# Patient Record
Sex: FEMALE | Race: BLACK OR AFRICAN AMERICAN
[De-identification: names, ages, dates, MRNs, and addresses within clinical notes are randomized per-mention and may not be internally consistent; named-entity substitution may affect disease eponyms.]

---

## 2022-10-26 ENCOUNTER — OFFICE VISIT (OUTPATIENT)
Dept: PRIMARY CARE CLINIC | Facility: CLINIC | Age: 47
End: 2022-10-26
Payer: MEDICARE

## 2022-10-26 VITALS
OXYGEN SATURATION: 95 % | HEIGHT: 72 IN | HEART RATE: 93 BPM | BODY MASS INDEX: 37.25 KG/M2 | RESPIRATION RATE: 17 BRPM | WEIGHT: 275 LBS | SYSTOLIC BLOOD PRESSURE: 162 MMHG | TEMPERATURE: 97.9 F | DIASTOLIC BLOOD PRESSURE: 107 MMHG

## 2022-10-26 DIAGNOSIS — Z12.31 ENCOUNTER FOR SCREENING MAMMOGRAM FOR MALIGNANT NEOPLASM OF BREAST: ICD-10-CM

## 2022-10-26 DIAGNOSIS — R73.03 PRE-DIABETES: ICD-10-CM

## 2022-10-26 DIAGNOSIS — L40.9 PSORIASIS: ICD-10-CM

## 2022-10-26 DIAGNOSIS — M25.561 CHRONIC PAIN OF RIGHT KNEE: Primary | ICD-10-CM

## 2022-10-26 DIAGNOSIS — Z11.59 ENCOUNTER FOR HEPATITIS C SCREENING TEST FOR LOW RISK PATIENT: ICD-10-CM

## 2022-10-26 DIAGNOSIS — E78.2 MIXED HYPERLIPIDEMIA: ICD-10-CM

## 2022-10-26 DIAGNOSIS — I10 ESSENTIAL HYPERTENSION: ICD-10-CM

## 2022-10-26 DIAGNOSIS — Z11.4 ENCOUNTER FOR SCREENING FOR HIV: ICD-10-CM

## 2022-10-26 DIAGNOSIS — Z01.419 PERIODIC HEALTH ASSESSMENT, PAP AND PELVIC: ICD-10-CM

## 2022-10-26 DIAGNOSIS — Z79.899 MEDICATION MANAGEMENT: ICD-10-CM

## 2022-10-26 DIAGNOSIS — R87.618 OTHER ABNORMAL CYTOLOGICAL FINDING OF SPECIMEN FROM CERVIX: ICD-10-CM

## 2022-10-26 DIAGNOSIS — E66.01 SEVERE OBESITY (BMI 35.0-39.9) WITH COMORBIDITY (HCC): ICD-10-CM

## 2022-10-26 DIAGNOSIS — G89.29 CHRONIC PAIN OF RIGHT KNEE: Primary | ICD-10-CM

## 2022-10-26 DIAGNOSIS — F17.210 CIGARETTE SMOKER: ICD-10-CM

## 2022-10-26 PROBLEM — M19.071 PRIMARY OSTEOARTHRITIS OF RIGHT ANKLE: Status: ACTIVE | Noted: 2022-07-21

## 2022-10-26 PROCEDURE — 99204 OFFICE O/P NEW MOD 45 MIN: CPT | Performed by: FAMILY MEDICINE

## 2022-10-26 PROCEDURE — 3077F SYST BP >= 140 MM HG: CPT | Performed by: FAMILY MEDICINE

## 2022-10-26 PROCEDURE — 3080F DIAST BP >= 90 MM HG: CPT | Performed by: FAMILY MEDICINE

## 2022-10-26 PROCEDURE — 99406 BEHAV CHNG SMOKING 3-10 MIN: CPT | Performed by: FAMILY MEDICINE

## 2022-10-26 RX ORDER — DICLOFENAC SODIUM 75 MG/1
TABLET, DELAYED RELEASE ORAL
COMMUNITY
Start: 2022-10-11

## 2022-10-26 RX ORDER — MELOXICAM 7.5 MG/1
TABLET ORAL
COMMUNITY
Start: 2022-08-15

## 2022-10-26 ASSESSMENT — ENCOUNTER SYMPTOMS
BACK PAIN: 0
TROUBLE SWALLOWING: 0
DIARRHEA: 0
RHINORRHEA: 0
ABDOMINAL PAIN: 0
COLOR CHANGE: 0
VOICE CHANGE: 0
VOMITING: 0
CONSTIPATION: 0
SINUS PRESSURE: 0
WHEEZING: 0
CHOKING: 0
SINUS PAIN: 0
BLOOD IN STOOL: 0
PHOTOPHOBIA: 0
EYE PAIN: 0
COUGH: 0
EYE DISCHARGE: 0
SORE THROAT: 0
CHEST TIGHTNESS: 0
NAUSEA: 0
ABDOMINAL DISTENTION: 0
EYE REDNESS: 0
SHORTNESS OF BREATH: 0

## 2022-10-26 ASSESSMENT — PATIENT HEALTH QUESTIONNAIRE - PHQ9
SUM OF ALL RESPONSES TO PHQ QUESTIONS 1-9: 0
SUM OF ALL RESPONSES TO PHQ QUESTIONS 1-9: 0
1. LITTLE INTEREST OR PLEASURE IN DOING THINGS: 0
SUM OF ALL RESPONSES TO PHQ9 QUESTIONS 1 & 2: 0
SUM OF ALL RESPONSES TO PHQ QUESTIONS 1-9: 0
SUM OF ALL RESPONSES TO PHQ QUESTIONS 1-9: 0
2. FEELING DOWN, DEPRESSED OR HOPELESS: 0
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
## 2022-10-26 NOTE — PATIENT INSTRUCTIONS
Diabetic teaching, diet, increase vegetables- at least 5 portions a day, roughly half plate, beans, whole grains, grilled or baked white meats , dairy products, exercise at least an hr - brisk walk aerobic of choice, No sugar/ suggary drinks including juice/ fats/ fried foods.  starch- bread/ potato/ rice. ( It takes roughly 40 minute of walk  To burn a 12 oz regular drink/ juice). Cutting out 12 oz drink a day equals to roughly 4 lb weight a yr! Decrease screen time- TV, Video, computer , cell phones- recommended less than 2 hrs a day  Type 2 diabetes is very common, obesity is the main reason for diabetes and  insulin resistance, most of the type 2 diabetes can be cured by weight management exercise. . Most type 2 diabetes has high insulin level  and high insulin level causes most of diabetic complications microvascular and macrovascular, damage to kidneys, eyes , cardiovascular , and neuropathy,, medications that correct insulin resistance such as metformin has been shown to decrease these complications by lowering insulin level and correcting insulin resistance. Frequent blood sugar checking is unnecessary    Frequent blood sugar checking is not necessity, normal person without diabetess fasting blood sugar is usually less than 105, after 3 -4 weeks of treatment, either diet alone, or diet and metformin, if fasting blood sugar less than 120, frequent BS checking is not necessary and continue diet exercise Metformin is enough. Starting metformin early and preventing diabetic complications. Exercise and weight management is most important    Adding insulin and continuing increasing dose,  not usually prevent diabetic complications .  Some newer medications that do not cause low BS, may help diabeted by supressing apetite and making pee sugar , may help loose weight ,  may be more beneficial when over weight, but are quite expensive and often not covered by insurance, long term benefits are not known, and do have lot of side effects and risks    High blood sugar less than 300 usually causes no symptoms and patient is unaware, of the diabetes, and causes a significant diabetic complications and #1 cause of losing legs , kidneys and eye sight and cardiovascular risk     Focusing on blood sugar does not prevent diabetic complication, but diet, exercise , weight management ,  metformin early on , do prevent diabetic complications    If insulin do become necessary, usually 30-40 unit long acting insulin taken bed time, with small frequent meals may be more beneficial, keeping fasting blood sugar less than 140, through diet , exercise, weight management and metformin- recommended as first line diabetic medication with GFR more than 30 by all most medical organizations, and need be continued with or without insulin. In normal weight persons BMI less than 25, may be insulin deficient and Insulin log acting usually less than 30 units may help , with or without metformin if fasting BS more than 140  . Increase fruits vegetables, fiber, whole grains, beans, exercise, tree nuts, will decrease risk of heart attacks and strokes, may reduce cancer risks     once a day multivitamin such as Centrum  store brand, due to benefit of folic acid vitamin D, has already mineral vitamin is recommended doses. Multiple different vitamins not recommended may carry increased risk, including vitamin E, take foods rich in omega 3 and fibre, pills are not recommended, including omega 3 in high doses, may have increased risks     Chronic smokers, smokes. .... , conseled risk of smoking, chronic bronchitis, DVT, peripheral vascular diease, cancers, cardio-vascular disease, osteoporosis, COPD/ emphysema, PUD. Advised to quit smoking. ... , discussed nicotine replacement therapy, recommended short term OTC if helps to quit smoking.  3-5 minutes spent on counseling

## 2022-10-27 LAB
ALBUMIN SERPL-MCNC: 3.6 G/DL (ref 3.5–5)
ALBUMIN/GLOB SERPL: 1.1 {RATIO} (ref 0.4–1.6)
ALP SERPL-CCNC: 89 U/L (ref 50–136)
ALT SERPL-CCNC: 17 U/L (ref 12–65)
ANION GAP SERPL CALC-SCNC: 3 MMOL/L (ref 2–11)
AST SERPL-CCNC: 9 U/L (ref 15–37)
BASOPHILS # BLD: 0 K/UL (ref 0–0.2)
BASOPHILS NFR BLD: 1 % (ref 0–2)
BILIRUB SERPL-MCNC: 0.3 MG/DL (ref 0.2–1.1)
BUN SERPL-MCNC: 10 MG/DL (ref 6–23)
CALCIUM SERPL-MCNC: 9.5 MG/DL (ref 8.3–10.4)
CHLORIDE SERPL-SCNC: 109 MMOL/L (ref 101–110)
CHOLEST SERPL-MCNC: 190 MG/DL
CO2 SERPL-SCNC: 26 MMOL/L (ref 21–32)
CREAT SERPL-MCNC: 1 MG/DL (ref 0.6–1)
DIFFERENTIAL METHOD BLD: NORMAL
EOSINOPHIL # BLD: 0.2 K/UL (ref 0–0.8)
EOSINOPHIL NFR BLD: 3 % (ref 0.5–7.8)
ERYTHROCYTE [DISTWIDTH] IN BLOOD BY AUTOMATED COUNT: 14.6 % (ref 11.9–14.6)
EST. AVERAGE GLUCOSE BLD GHB EST-MCNC: 123 MG/DL
GLOBULIN SER CALC-MCNC: 3.2 G/DL (ref 2.8–4.5)
GLUCOSE SERPL-MCNC: 98 MG/DL (ref 65–100)
HBA1C MFR BLD: 5.9 % (ref 4.8–5.6)
HCT VFR BLD AUTO: 44.4 % (ref 35.8–46.3)
HCV AB SER QL: NONREACTIVE
HDLC SERPL-MCNC: 40 MG/DL (ref 40–60)
HDLC SERPL: 4.8 {RATIO}
HGB BLD-MCNC: 14 G/DL (ref 11.7–15.4)
HIV 1+2 AB+HIV1 P24 AG SERPL QL IA: NONREACTIVE
HIV 1/2 RESULT COMMENT: NORMAL
IMM GRANULOCYTES # BLD AUTO: 0 K/UL (ref 0–0.5)
IMM GRANULOCYTES NFR BLD AUTO: 0 % (ref 0–5)
LDLC SERPL CALC-MCNC: 125.8 MG/DL
LYMPHOCYTES # BLD: 2.6 K/UL (ref 0.5–4.6)
LYMPHOCYTES NFR BLD: 35 % (ref 13–44)
MCH RBC QN AUTO: 27.5 PG (ref 26.1–32.9)
MCHC RBC AUTO-ENTMCNC: 31.5 G/DL (ref 31.4–35)
MCV RBC AUTO: 87.1 FL (ref 82–102)
MONOCYTES # BLD: 0.7 K/UL (ref 0.1–1.3)
MONOCYTES NFR BLD: 9 % (ref 4–12)
NEUTS SEG # BLD: 4 K/UL (ref 1.7–8.2)
NEUTS SEG NFR BLD: 52 % (ref 43–78)
NRBC # BLD: 0 K/UL (ref 0–0.2)
PLATELET # BLD AUTO: 395 K/UL (ref 150–450)
PMV BLD AUTO: 9.8 FL (ref 9.4–12.3)
POTASSIUM SERPL-SCNC: 4.2 MMOL/L (ref 3.5–5.1)
PROT SERPL-MCNC: 6.8 G/DL (ref 6.3–8.2)
RBC # BLD AUTO: 5.1 M/UL (ref 4.05–5.2)
SODIUM SERPL-SCNC: 138 MMOL/L (ref 133–143)
TRIGL SERPL-MCNC: 121 MG/DL (ref 35–150)
TSH, 3RD GENERATION: 1.38 UIU/ML (ref 0.36–3.74)
VLDLC SERPL CALC-MCNC: 24.2 MG/DL (ref 6–23)
WBC # BLD AUTO: 7.5 K/UL (ref 4.3–11.1)

## 2022-11-02 ENCOUNTER — TELEPHONE (OUTPATIENT)
Dept: PRIMARY CARE CLINIC | Facility: CLINIC | Age: 47
End: 2022-11-02

## 2022-11-02 NOTE — TELEPHONE ENCOUNTER
----- Message from Cherrie Rogers MD sent at 10/27/2022  8:48 AM EDT -----  .   Hyperlipidemia prediabetic, low-cholesterol diet weight management healthy diet decrease sugar starch fat fried foods

## 2022-11-15 ENCOUNTER — OFFICE VISIT (OUTPATIENT)
Dept: OBGYN CLINIC | Age: 47
End: 2022-11-15
Payer: MEDICARE

## 2022-11-15 VITALS
BODY MASS INDEX: 36.7 KG/M2 | HEIGHT: 72 IN | DIASTOLIC BLOOD PRESSURE: 90 MMHG | SYSTOLIC BLOOD PRESSURE: 142 MMHG | WEIGHT: 271 LBS

## 2022-11-15 DIAGNOSIS — N92.6 IRREGULAR MENSES: ICD-10-CM

## 2022-11-15 DIAGNOSIS — Z12.4 PAP SMEAR FOR CERVICAL CANCER SCREENING: Primary | ICD-10-CM

## 2022-11-15 DIAGNOSIS — Z11.3 SCREEN FOR STD (SEXUALLY TRANSMITTED DISEASE): ICD-10-CM

## 2022-11-15 DIAGNOSIS — I10 ESSENTIAL HYPERTENSION: ICD-10-CM

## 2022-11-15 DIAGNOSIS — Z01.419 ENCOUNTER FOR ANNUAL ROUTINE GYNECOLOGICAL EXAMINATION: ICD-10-CM

## 2022-11-15 PROCEDURE — 3080F DIAST BP >= 90 MM HG: CPT | Performed by: NURSE PRACTITIONER

## 2022-11-15 PROCEDURE — 99386 PREV VISIT NEW AGE 40-64: CPT | Performed by: NURSE PRACTITIONER

## 2022-11-15 PROCEDURE — 3077F SYST BP >= 140 MM HG: CPT | Performed by: NURSE PRACTITIONER

## 2022-11-15 RX ORDER — FLUOCINOLONE ACETONIDE 0.11 MG/ML
OIL TOPICAL 3 TIMES DAILY
COMMUNITY

## 2022-11-15 RX ORDER — KETOCONAZOLE 20 MG/ML
SHAMPOO TOPICAL
COMMUNITY
Start: 2022-10-19

## 2022-11-15 ASSESSMENT — PATIENT HEALTH QUESTIONNAIRE - PHQ9
2. FEELING DOWN, DEPRESSED OR HOPELESS: 0
SUM OF ALL RESPONSES TO PHQ9 QUESTIONS 1 & 2: 0
SUM OF ALL RESPONSES TO PHQ QUESTIONS 1-9: 0
1. LITTLE INTEREST OR PLEASURE IN DOING THINGS: 0
SUM OF ALL RESPONSES TO PHQ QUESTIONS 1-9: 0

## 2022-11-15 NOTE — PROGRESS NOTES
Sarkis Wang is a 56 y.o. [id number] who is here today for annual exam. Pt reports hot flashes since she was in her 35s. States they don't bother her and she doesn't want any treatment for them at this time. Pt seen by PCP with elevated blood pressures last month. No medications at this time and has f/u with PCP in 2 weeks        Patient's last menstrual period was 2022 (approximate). Menses: pt does not keep menstrual calender difficult t obtain hx. Usually at least monthly, although in the last year did have 1 or 2 menses that were a week apart. This is not her normal. Last menses ws light flow, lasted only 2 days    Birth Control:essure    Last Pap:2018    Hx abnormal pap or STD:trich vag    Last Mammo: referral sent by PCP    Fam Hx of Breast, ovarian or uterine cancer:denies    Sexually Active:yes    Number of partners in the last year:1          ROS:    Breast: Denies pain, lump or nipple discharge    GYN: Denies pelvic pain, discharge, itching, odor or dysuria. Constitutional: Negative for chills and fever. HENT: Negative for severe headaches or vision changes    Respiratory: Negative for cough and shortness of breath. Cardiovascular: Negative for chest pain and palpitations. Gastrointestinal: Negative for nausea and vomiting. Negative for diarrhea and constipation    Genitourinary: Negative for dysuria and hematuria. Musculoskeletal: Negative for back pain    Skin: Negative for rash and wound.      Psych: No depression or anxiety          Past Medical History:   Diagnosis Date    Abnormal Pap smear of cervix        Past Surgical History:   Procedure Laterality Date    BREAST ENHANCEMENT SURGERY Bilateral     CERCLAGE       SECTION      WRIST SURGERY Bilateral        Family History   Problem Relation Age of Onset    Breast Cancer Neg Hx     Ovarian Cancer Neg Hx     Uterine Cancer Neg Hx     Colon Cancer Neg Hx        Social History     Socioeconomic History    Marital status: Single     Spouse name: Not on file    Number of children: Not on file    Years of education: Not on file    Highest education level: Not on file   Occupational History    Not on file   Tobacco Use    Smoking status: Every Day     Types: Cigarettes    Smokeless tobacco: Never   Substance and Sexual Activity    Alcohol use: Never    Drug use: Never    Sexual activity: Not Currently     Birth control/protection: Surgical     Comment: essure   Other Topics Concern    Not on file   Social History Narrative    Not on file     Social Determinants of Health     Financial Resource Strain: Not on file   Food Insecurity: Not on file   Transportation Needs: Not on file   Physical Activity: Not on file   Stress: Not on file   Social Connections: Not on file   Intimate Partner Violence: Not on file   Housing Stability: Not on file           Objective:    Vitals:    11/15/22 1403   BP: (!) 142/90   Site: Left Upper Arm   Position: Sitting   Weight: 271 lb (122.9 kg)   Height: 6' (1.829 m)           Constitutional:  well-developed, well-nourished, and in no distress. Mental: Alert and awake. Oriented to person/place/time. Able to follow commands    Eyes: EOM nl, Sclera nl, Ocular Discharge not visualized    HENT: Normocephalic and atraumatic. Mouth/Throat: Mucous membranes are moist. External Ears: nl. No cervical code adneopathy    Neck: Normal range of motion. Neck supple. No thyromegaly present. Cardiovascular: Normal rate and regular rhythm. Pulmonary: Effort normal; No visualized signs of difficulty breathing or respiratory distress; breath sounds normal.    Breast: The breasts exhibit no masses, no skin changes and no tenderness. No axillary node adenopathy. piercings    Abdominal: Soft. There is no tenderness. There is no rebound.      Pelvic Exam:       External: normal female genitalia without lesions or masses       Vagina: normal without lesions or masses, no discharge noted      Cervix: normal without lesions or masses       Adnexa: normal bimanual exam without masses or fullness       Uterus: uterus is normal size, shape, consistency and nontender    Musculoskeletal: Normal range of motion. Normal gait with no signs of ataxia     Neurological: No Facial Asymmetry (Cranial nerve 7 motor function); No gaze palsy; normal coordination, muscle strength and tone     Skin: Skin is warm and dry. No significant exanthematous lesions or discoloration noted on facial skin      Psych: Normal affect. No hallucinations            Assessment/Plan            Patient Active Problem List    Diagnosis Date Noted    Encounter for annual routine gynecological examination 11/15/2022     Priority: Medium     Assessment & Plan Note:     Pap today + GC (recent std screening negative)  mammo referral sent by PCP, pt given number to call to schedule  essure for birth control      Irregular menses 11/15/2022     Priority: Medium     Assessment & Plan Note:     Difficult to btain menstrual hx but suspect perimenopausal bleeding but will get US and discuss menstrual calender      Severe obesity (BMI 35.0-39. 9) with comorbidity ([address] 10/26/2022     Priority: Medium    Essential hypertension 10/26/2022     Priority: Medium     Assessment & Plan Note:     Followed by PCP      Cigarette smoker 07/21/2022     Priority: Medium     Overview Note:     Last Assessment & Plan:   Formatting of this note is different from the original.  Condition: Everyday Smoker  Member encouraged to continue working on smoking cessation. Encouraged to discuss options with PCP. Provided number 3-433-QUIT-NOW ([phone]) for free confidential help to assist her with quitting smoking. [employer] provide free coaching--over the phone--to help you quit smoking. When you call 4-540-QUIT-NOW , you can speak confidentially with a highly trained quit .     Follow up in: three months      Primary osteoarthritis of right ankle 07/21/2022     Priority: Medium Overview Note:     Last Assessment & Plan:   Formatting of this note might be different from the original.  Condition: stable  Cont therapy 2x a week   Take medications as Rx  Follow up in: three months      Psoriasis 07/21/2022     Priority: Medium     Overview Note:     Last Assessment & Plan:   Formatting of this note might be different from the original.  Condition: stable  Ensure medication compliance as prescribed by your PCP/Specialist.  Keep all schedule appointments. Follow up in: three months         Problem List Items Addressed This Visit          Circulatory    Essential hypertension     Followed by PCP            Other    Encounter for annual routine gynecological examination     Pap today + GC (recent std screening negative)  mammo referral sent by PCP, pt given number to call to schedule  essure for birth control         Irregular menses     Difficult to btain menstrual hx but suspect perimenopausal bleeding but will get US and discuss menstrual calender          Other Visit Diagnoses       Pap smear for cervical cancer screening    -  Primary    Relevant Orders    PAP IG, CT-NG-TV, Aptima HPV and rfx 16/18,45 ([id number])    Screen for STD (sexually transmitted disease)        Relevant Orders    PAP IG, CT-NG-TV, Aptima HPV and rfx 16/18,45 ([id number])            Orders Placed This Encounter   Procedures    PAP IG, CT-NG-TV, Aptima HPV and rfx 16/18,45 ([id number])       Outpatient Encounter Medications as of 11/15/2022   Medication Sig Dispense Refill    ketoconazole (NIZORAL) 2 % shampoo WASH SCALP 2-3 TIMES A WEEK, LET IT SIT FOR 10-15 MINUTES THEN RINSE      fluocinolone (DERMA-SMOOTHE) 0.01 % external oil Apply topically 3 times daily Apply topically.       diclofenac (VOLTAREN) 75 MG EC tablet       meloxicam (MOBIC) 7.5 MG tablet TAKE 1 TABLET BY MOUTH TWICE DAILY AS NEEDED FOR MODERATE PAIN (Patient not taking: No sig reported)       No facility-administered encounter medications on file as of 11/15/2022.

## 2022-11-15 NOTE — ASSESSMENT & PLAN NOTE
Difficult to btain menstrual hx but suspect perimenopausal bleeding but will get US and discuss menstrual calender

## 2022-11-15 NOTE — PROGRESS NOTES
Pt comes in today for AE. Pt reports regular periods every month but not at the same time of the months. Pt reports last period was very light and short. Pt has also stated hot flashes since age 40. LAST PAP:  09/13/2018 ASCUS, HPV positive     LAST MAMMO: Unknown     LMP:  Patient's last menstrual period was 11/02/2022 (approximate).     BIRTH CONTROL:  none    TOBACCO USE:  Yes    FAMILY HISTORY OF:   Breast Cancer:  No   Ovarian Cancer:  No   Uterine Cancer:  No   Colon Cancer:  No    Vitals:    11/15/22 1403   BP: (!) 142/90   Site: Left Upper Arm   Position: Sitting   Weight: 271 lb (122.9 kg)   Height: 6' (1.829 m)        Pramod Rogers MA  11/15/22  2:20 PM

## 2022-11-15 NOTE — PROGRESS NOTES
I have reviewed the patient's visit today including history, exam and assessment by VIRI White. I agree with treatment/plan as above.     Juan Diego Hill MD  4:06 PM  11/15/22

## 2022-11-15 NOTE — ASSESSMENT & PLAN NOTE
Pap today + GC (recent std screening negative)  mammo referral sent by PCP, pt given number to call to schedule  essure for birth control

## 2022-11-22 LAB
C TRACH RRNA CVX QL NAA+PROBE: NEGATIVE
CYTOLOGIST CVX/VAG CYTO: ABNORMAL
CYTOLOGY CVX/VAG DOC THIN PREP: ABNORMAL
HPV APTIMA: POSITIVE
HPV GENOTYPE 18,45: NEGATIVE
HPV GENOTYPE REFLEX: ABNORMAL
HPV, GENOTYPE 16: POSITIVE
Lab: ABNORMAL
N GONORRHOEA RRNA CVX QL NAA+PROBE: NEGATIVE
PATH REPORT.FINAL DX SPEC: ABNORMAL
PATHOLOGIST CVX/VAG CYTO: ABNORMAL
STAT OF ADQ CVX/VAG CYTO-IMP: ABNORMAL
T VAGINALIS RRNA SPEC QL NAA+PROBE: NEGATIVE

## 2022-11-23 ENCOUNTER — TELEPHONE (OUTPATIENT)
Dept: OBGYN CLINIC | Age: 47
End: 2022-11-23

## 2022-11-23 NOTE — TELEPHONE ENCOUNTER
Please let pt know pap smear was negative for precancer or cancer cells, but she did test positive for HPV type 16 which can lead to precancer changes. Please schedule patient for colposcopy (after her [address] visit on 11/28/2022), send [employer] handout on abnormal pap smear) and review the following        We encourage you to stop smoking (if you do)  Take a multivitamin each day  Eat more cruciferous vegetables (sweet potatoes, spinach, kale, papaya, oranges, sweet peppers,and tomatoes) and other things high in antioxidants like green tea, pomegranate, dark chocolate, etc.  This will boost your immune system and help with your abnormal pap smear. Exercise 30minutes/day  To help prevent the spread of sexually transmitted diseases, condoms must be worn during sexual activity.

## 2022-11-25 NOTE — TELEPHONE ENCOUNTER
Called pt and let her know her pap smear was negative for precancer or cancer cells, but she did test positive for HPV type 16 which can lead to precancer changes. Encouraged pt to stop smoking, eat more cruciferous vegetables (sweet potatoes, spinach, kale, papaya, oranges, sweet peppers,and tomatoes) and other things high in antioxidants like green tea, pomegranate, dark chocolate, etc to help boost her immune system. Also encouraged pt to take a multivitamin each day, exercise 30 minutes each day, and to wear a condom during sexual activity. Pt voiced understanding. Pt would like to schedule colpo at her next visit on 11/29. Please send [employer] handout.

## 2022-11-28 ENCOUNTER — OFFICE VISIT (OUTPATIENT)
Dept: PRIMARY CARE CLINIC | Facility: CLINIC | Age: 47
End: 2022-11-28
Payer: MEDICARE

## 2022-11-28 VITALS
WEIGHT: 268 LBS | RESPIRATION RATE: 16 BRPM | BODY MASS INDEX: 36.3 KG/M2 | HEIGHT: 72 IN | SYSTOLIC BLOOD PRESSURE: 152 MMHG | OXYGEN SATURATION: 98 % | DIASTOLIC BLOOD PRESSURE: 96 MMHG | HEART RATE: 102 BPM | TEMPERATURE: 97.7 F

## 2022-11-28 DIAGNOSIS — G89.29 CHRONIC PAIN OF RIGHT KNEE: ICD-10-CM

## 2022-11-28 DIAGNOSIS — E78.41 ELEVATED LIPOPROTEIN(A): ICD-10-CM

## 2022-11-28 DIAGNOSIS — R73.03 PRE-DIABETES: ICD-10-CM

## 2022-11-28 DIAGNOSIS — M25.561 CHRONIC PAIN OF RIGHT KNEE: ICD-10-CM

## 2022-11-28 DIAGNOSIS — F17.210 CIGARETTE SMOKER: ICD-10-CM

## 2022-11-28 DIAGNOSIS — Z00.01 ENCOUNTER FOR GENERAL ADULT MEDICAL EXAMINATION WITH ABNORMAL FINDINGS: Primary | ICD-10-CM

## 2022-11-28 DIAGNOSIS — R87.810 CERVICAL HIGH RISK HPV (HUMAN PAPILLOMAVIRUS) TEST POSITIVE: ICD-10-CM

## 2022-11-28 DIAGNOSIS — Z00.00 ENCOUNTER FOR WELL ADULT EXAM WITHOUT ABNORMAL FINDINGS: ICD-10-CM

## 2022-11-28 DIAGNOSIS — I10 UNCONTROLLED HYPERTENSION: ICD-10-CM

## 2022-11-28 DIAGNOSIS — E66.01 SEVERE OBESITY (BMI 35.0-39.9) WITH COMORBIDITY (HCC): ICD-10-CM

## 2022-11-28 DIAGNOSIS — Z91.89 MULTIPLE RISK FACTORS FOR CORONARY ARTERY DISEASE: ICD-10-CM

## 2022-11-28 DIAGNOSIS — M19.90 ARTHRITIS: ICD-10-CM

## 2022-11-28 DIAGNOSIS — Z79.899 MEDICATION MANAGEMENT: ICD-10-CM

## 2022-11-28 PROCEDURE — 3077F SYST BP >= 140 MM HG: CPT | Performed by: FAMILY MEDICINE

## 2022-11-28 PROCEDURE — 99396 PREV VISIT EST AGE 40-64: CPT | Performed by: FAMILY MEDICINE

## 2022-11-28 PROCEDURE — 99213 OFFICE O/P EST LOW 20 MIN: CPT | Performed by: FAMILY MEDICINE

## 2022-11-28 PROCEDURE — 3080F DIAST BP >= 90 MM HG: CPT | Performed by: FAMILY MEDICINE

## 2022-11-28 RX ORDER — PRAVASTATIN SODIUM 40 MG
40 TABLET ORAL DAILY
Qty: 90 TABLET | Refills: 5 | Status: SHIPPED | OUTPATIENT
Start: 2022-11-28

## 2022-11-28 RX ORDER — AMLODIPINE BESYLATE 5 MG/1
5 TABLET ORAL DAILY
Qty: 90 TABLET | Refills: 1 | Status: SHIPPED | OUTPATIENT
Start: 2022-11-28

## 2022-11-28 RX ORDER — CHLORTHALIDONE 25 MG/1
25 TABLET ORAL DAILY
Qty: 90 TABLET | Refills: 3 | Status: SHIPPED | OUTPATIENT
Start: 2022-11-28

## 2022-11-28 RX ORDER — MULTIVIT-MIN/IRON FUM/FOLIC AC 7.5 MG-4
TABLET ORAL
Qty: 100 TABLET | Refills: 3 | Status: SHIPPED | OUTPATIENT
Start: 2022-11-28

## 2022-11-28 ASSESSMENT — PATIENT HEALTH QUESTIONNAIRE - PHQ9
SUM OF ALL RESPONSES TO PHQ QUESTIONS 1-9: 0
SUM OF ALL RESPONSES TO PHQ QUESTIONS 1-9: 0
2. FEELING DOWN, DEPRESSED OR HOPELESS: 0
SUM OF ALL RESPONSES TO PHQ QUESTIONS 1-9: 0
SUM OF ALL RESPONSES TO PHQ9 QUESTIONS 1 & 2: 0
1. LITTLE INTEREST OR PLEASURE IN DOING THINGS: 0
SUM OF ALL RESPONSES TO PHQ QUESTIONS 1-9: 0

## 2022-11-28 ASSESSMENT — ENCOUNTER SYMPTOMS
BACK PAIN: 0
COLOR CHANGE: 0
VOICE CHANGE: 0
RHINORRHEA: 0
DIARRHEA: 0
ABDOMINAL DISTENTION: 0
CHOKING: 0
EYE DISCHARGE: 0
CONSTIPATION: 0
NAUSEA: 0
SINUS PRESSURE: 0
SORE THROAT: 0
WHEEZING: 0
VOMITING: 0
ABDOMINAL PAIN: 0
PHOTOPHOBIA: 0
BLOOD IN STOOL: 0
TROUBLE SWALLOWING: 0
EYE PAIN: 0
SHORTNESS OF BREATH: 0
EYE REDNESS: 0
SINUS PAIN: 0
COUGH: 0
CHEST TIGHTNESS: 0

## 2022-11-28 ASSESSMENT — VISUAL ACUITY
OD_CC: 20/30
OS_CC: 20/40

## 2022-11-28 NOTE — PROGRESS NOTES
[facility] Adult Note  Name: Lawrence Nava Date: 2022   MRN: [id number] Sex: Female   Age: 56 y.o. Ethnicity: Non- / Non    : 1975 Race: Dearl Niraj / African American      Timothyjessie Boo is here for well adult exam.  History:      Review of Systems   Constitutional:  Negative for activity change, appetite change, chills, diaphoresis, fatigue, fever and unexpected weight change. HENT:  Negative for congestion, ear pain, hearing loss, nosebleeds, rhinorrhea, sinus pressure, sinus pain, sore throat, trouble swallowing and voice change. Eyes:  Negative for photophobia, pain, discharge, redness and visual disturbance. Respiratory:  Negative for cough, choking, chest tightness, shortness of breath and wheezing. Cardiovascular:  Negative for chest pain, palpitations and leg swelling. Gastrointestinal:  Negative for abdominal distention, abdominal pain, blood in stool, constipation, diarrhea, nausea and vomiting. Endocrine: Negative for polydipsia, polyphagia and polyuria. Genitourinary:  Positive for menstrual problem and vaginal bleeding. Negative for difficulty urinating, dysuria, frequency, genital sores, hematuria, urgency, vaginal discharge and vaginal pain. Musculoskeletal:  Positive for arthralgias. Negative for back pain, gait problem, joint swelling, myalgias and neck pain. Chronic right knee pain arthritis seen orthopedics physical therapy remote injury had several intra-articular steroid shot   Skin:  Negative for color change and rash. Allergic/Immunologic: Negative for environmental allergies. Neurological:  Negative for dizziness, tremors, seizures, syncope, speech difficulty, weakness, numbness and headaches. Hematological:  Negative for adenopathy. Does not bruise/bleed easily. Psychiatric/Behavioral:  Negative for behavioral problems, confusion, decreased concentration, hallucinations, self-injury, sleep disturbance and suicidal ideas.  The patient is not nervous/anxious. No Known Allergies      Prior to Visit Medications    Medication Sig Taking? Authorizing Provider   chlorthalidone (HYGROTON) 25 MG tablet Take 1 tablet by mouth daily Yes Marium Wick MD   amLODIPine (NORVASC) 5 MG tablet Take 1 tablet by mouth daily Yes Marium Wick MD   pravastatin (PRAVACHOL) 40 MG tablet Take 1 tablet by mouth daily Yes Alexys Rachel MD   ketoconazole (NIZORAL) 2 % shampoo WASH SCALP 2-3 TIMES A WEEK, LET IT SIT FOR 10-15 MINUTES THEN RINSE Yes Historical Provider, MD   fluocinolone (DERMA-SMOOTHE) 0.01 % external oil Apply topically 3 times daily Apply topically. Yes Historical Provider, MD   diclofenac (VOLTAREN) 75 MG EC tablet  Yes Historical Provider, MD   meloxicam (MOBIC) 7.5 MG tablet TAKE 1 TABLET BY MOUTH TWICE DAILY AS NEEDED FOR MODERATE PAIN Yes Historical Provider, MD         Past Medical History:   Diagnosis Date    Abnormal Pap smear of cervix     HPV (human papilloma virus) infection        Past Surgical History:   Procedure Laterality Date    BREAST ENHANCEMENT SURGERY Bilateral     CERCLAGE       SECTION      WRIST SURGERY Bilateral          Family History   Problem Relation Age of Onset    Breast Cancer Neg Hx     Ovarian Cancer Neg Hx     Uterine Cancer Neg Hx     Colon Cancer Neg Hx        Social History     Tobacco Use    Smoking status: Every Day     Types: Cigarettes    Smokeless tobacco: Never   Substance Use Topics    Alcohol use: Never    Drug use: Never       Objective   BP (!) 166/100 (Site: Left Upper Arm, Position: Sitting, Cuff Size: Medium Adult)   Pulse (!) 102   Temp 97.7 °F (36.5 °C) (Tympanic)   Resp 16   Ht 6' (1.829 m)   Wt 268 lb (121.6 kg)   LMP 2022 (Approximate)   SpO2 98%   BMI 36.35 kg/m²   Wt Readings from Last 3 Encounters:   22 268 lb (121.6 kg)   11/15/22 271 lb (122.9 kg)   10/26/22 275 lb (124.7 kg)     There were no vitals filed for this visit.       Physical Exam  Constitutional:       General: She is not in acute distress. Appearance: Normal appearance. She is obese. She is not ill-appearing, toxic-appearing or diaphoretic. HENT:      Head: Normocephalic and atraumatic. Right Ear: External ear normal.      Left Ear: External ear normal.      Nose: Nose normal. No congestion or rhinorrhea. Mouth/Throat:      Mouth: Mucous membranes are moist.      Pharynx: No oropharyngeal exudate or posterior oropharyngeal erythema. Eyes:      General: No scleral icterus. Right eye: No discharge. Left eye: No discharge. Extraocular Movements: Extraocular movements intact. Pupils: Pupils are equal, round, and reactive to light. Cardiovascular:      Rate and Rhythm: Normal rate and regular rhythm. Pulses: Normal pulses. Heart sounds: Normal heart sounds. No murmur heard. No gallop. Comments: Elevated blood pressure  Pulmonary:      Effort: No respiratory distress. Breath sounds: No stridor. No wheezing, rhonchi or rales. Chest:      Chest wall: No tenderness. Abdominal:      General: There is no distension. Palpations: There is no mass. Tenderness: There is no abdominal tenderness. There is no right CVA tenderness, guarding or rebound. Hernia: No hernia is present. Genitourinary:     Vagina: No vaginal discharge. Musculoskeletal:         General: Swelling present. No tenderness, deformity or signs of injury. Cervical back: Normal range of motion and neck supple. No rigidity or tenderness. Right lower leg: No edema. Left lower leg: No edema. Comments: Right knee pain swelling previous arthrocentesis went for physical therapy remote injury   Lymphadenopathy:      Cervical: No cervical adenopathy. Skin:     General: Skin is warm. Findings: No bruising, erythema, lesion or rash. Neurological:      General: No focal deficit present. Mental Status: She is alert.  Mental status is at baseline. Cranial Nerves: No cranial nerve deficit. Sensory: No sensory deficit. Motor: No weakness. Coordination: Coordination normal.      Gait: Gait normal.   Psychiatric:         Mood and Affect: Mood normal.         Thought Content: Thought content normal.         Judgment: Judgment normal.         Assessment   Plan   1. Encounter for general adult medical examination with abnormal findings  2. Uncontrolled hypertension  -     chlorthalidone (HYGROTON) 25 MG tablet; Take 1 tablet by mouth daily, Disp-90 tablet, R-3Normal  -     amLODIPine (NORVASC) 5 MG tablet; Take 1 tablet by mouth daily, Disp-90 tablet, R-1Normal  3. Cigarette smoker  4. Severe obesity (BMI 35.0-39. 9) with comorbidity ([facility] [address]  5. Pre-diabetes  6. Medication management  7. Cervical high risk HPV (human papillomavirus) test positive  8. Elevated lipoprotein(a)  9. Multiple risk factors for coronary artery disease  -     chlorthalidone (HYGROTON) 25 MG tablet; Take 1 tablet by mouth daily, Disp-90 tablet, R-3Normal  -     amLODIPine (NORVASC) 5 MG tablet; Take 1 tablet by mouth daily, Disp-90 tablet, R-1Normal  -     pravastatin (PRAVACHOL) 40 MG tablet; Take 1 tablet by mouth daily, Disp-90 tablet, R-5Normal  10. Arthritis  11.  Encounter for well adult exam without abnormal findings         Personalized Preventive Plan   Current Health Maintenance Status  Immunization History   Administered Date(s) Administered    Td vaccine (adult) 03/09/2006        Health Maintenance   Topic Date Due    COVID-19 Vaccine (1) Never done    Pneumococcal 0-64 years Vaccine (1 - PCV) Never done    DTaP/Tdap/Td vaccine (1 - Tdap) 03/10/2006    Colorectal Cancer Screen  Never done    Flu vaccine (1) Never done    A1C test (Diabetic or Prediabetic)  10/26/2023    Depression Screen  11/15/2023    Lipids  10/26/2027    Cervical cancer screen  11/15/2027    Hepatitis C screen  Completed    HIV screen  Completed    Hepatitis A vaccine Aged Out    Hib vaccine  Aged Out    Meningococcal (ACWY) vaccine  Aged Out     Recommendations for Practice Ignition Due: see orders and patient instructions/AVS.    No follow-ups on file. Cardiovascular Disease Risk Counseling: Assessed the patient's risk to develop cardiovascular disease and reviewed main risk factors. Reviewed steps to reduce disease risk including:   Quitting tobacco use, reducing amount smoked, or not starting the habit  Making healthy food choices  Being physically active and gradualy increasing activity levels   Reduce weight and determine a healthy BMI goal  Monitor blood pressure and treat if higher than 140/90 mmHg  Maintain blood total cholesterol levels under 5 mmol/l or 190 mg/dl  Maintain LDL cholesterol levels under 3.0 mmol/l or 115 mg/dl   Control blood glucose levels  Consider taking aspirin (75 mg daily), once blood pressure is controlled   Provided a follow up plan. Time spent (minutes)    Follow-up for numerous medical problems and physical.  Has 2 children menstrual problems HPV positive at upcoming gynecology appointment to discuss treatment options for menorrhagia. Recent lab test discussed. Hypertension elevated blood pressure start chlorthalidone amlodipine weight management strongly recommended to quit smoking smoking risk. Flu shot recommended. Orthopedic follow-up for chronic right knee pain. Treatment options discussed including diclofenac gel anti-inflammatory sparingly  Chronic smokers, smokes. .... , conseled risk of smoking, chronic bronchitis, DVT, peripheral vascular diease, cancers, cardio-vascular disease, osteoporosis, COPD/ emphysema, PUD. Advised to quit smoking. ... , discussed nicotine replacement therapy, recommended short term OTC if helps to quit smoking. 3-5 minutes spent on counseling ,. Declined flu shot  Discussed risk factor management for coronary artery disease  .       Increase fruits vegetables, fiber, whole grains, beans, exercise, tree nuts, will decrease risk of heart attacks and strokes, may reduce cancer risks     once a day multivitamin such as [employer] silver store brand, due to benefit of folic acid vitamin D, has already mineral vitamin is recommended doses.   Multiple different vitamins not recommended may carry increased risk, including vitamin E, take foods rich in omega 3 and fibre, pills are not recommended, including omega 3 in high doses, may have increased risks

## 2022-11-28 NOTE — PATIENT INSTRUCTIONS
Body Mass Index: Care Instructions  Your Care Instructions     Body mass index (BMI) can help you see if your weight is raising your risk for health problems. It uses a formula to compare how much you weigh with how tall you are. A BMI lower than 18.5 is considered underweight. A BMI between 18.5 and 24.9 is considered healthy. A BMI between 25 and 29.9 is considered overweight. A BMI of 30 or higher is considered obese. If your BMI is in the normal range, it means that you have a lower risk for weight-related health problems. If your BMI is in the overweight or obese range, you may be at increased risk for weight-related health problems, such as high blood pressure, heart disease, stroke, arthritis or joint pain, and diabetes. If your BMI is in the underweight range, you may be at increased risk for health problems such as fatigue, lower protection (immunity) against illness, muscle loss, bone loss, hair loss, and hormone problems. BMI is just one measure of your risk for weight-related health problems. You may be at higher risk for health problems if you are not active, you eat an unhealthy diet, or you drink too much alcohol or use tobacco products. Follow-up care is a key part of your treatment and safety. Be sure to make and go to all appointments, and call your doctor if you are having problems. It's also a good idea to know your test results and keep a list of the medicines you take. How can you care for yourself at home? Practice healthy eating habits. This includes eating plenty of fruits, vegetables, whole grains, lean protein, and low-fat dairy. If your doctor recommends it, get more exercise. Walking is a good choice. Bit by bit, increase the amount you walk every day. Try for at least 30 minutes on most days of the week. Do not smoke. Smoking can increase your risk for health problems. If you need help quitting, talk to your doctor about stop-smoking programs and medicines.  These can increase your chances of quitting for good. Limit alcohol to 2 drinks a day for men and 1 drink a day for women. Too much alcohol can cause health problems. If you have a BMI higher than 25  Your doctor may do other tests to check your risk for weight-related health problems. This may include measuring the distance around your waist. A waist measurement of more than 40 inches in men or 35 inches in women can increase the risk of weight-related health problems. Talk with your doctor about steps you can take to stay healthy or improve your health. You may need to make lifestyle changes to lose weight and stay healthy, such as changing your diet and getting regular exercise. If you have a BMI lower than 18.5  Your doctor may do other tests to check your risk for health problems. Talk with your doctor about steps you can take to stay healthy or improve your health. You may need to make lifestyle changes to gain or maintain weight and stay healthy, such as getting more healthy foods in your diet and doing exercises to build muscle. Where can you learn more? Go to https://Raft Internationaljameson.[email or web address] org and sign in to your [employer] account. Enter S176 in the [employer] box to learn more about \"Body Mass Index: Care Instructions. \"     If you do not have an account, please click on the \"Sign Up Now\" link. Current as of: December 27, 2021               Content Version: 13.4  © 2006-2022 Healthwise, Incorporated. Care instructions adapted under license by [facility] [place] ([facility]). If you have questions about a medical condition or this instruction, always ask your healthcare professional. [place] [address] any warranty or liability for your use of this information. Learning About Low-Carbohydrate Diets  What is a low-carbohydrate diet? A low-carbohydrate (or \"low-carb\") diet limits foods and drinks that have carbohydrates.  This includes grains, fruits, milk and yogurt, and starchy vegetables like potatoes, beans, and corn. It also avoids foods and drinks that have added sugar. Instead, low-carb diets include foods that are high in protein and fat. Why might you follow a low-carb diet? Low-carb diets may be used for a variety of reasons, such as for weight loss. People who have diabetes may use a low-carb diet to help manage their blood sugar levels. What should you do before you start the diet? Talk to your doctor before you try any diet. This is even more important if you have health problems like kidney disease, heart disease, or diabetes. Your doctor may suggest that you meet with a registered dietitian. A dietitian can help you make an eating plan that works for you. What foods do you eat on a low-carb diet? On a low-carb diet, you choose foods that are high in protein and fat. Examples of these are:  Meat, poultry, and fish. Eggs. Nuts, such as walnuts, pecans, almonds, and peanuts. Peanut butter and other nut butters. Tofu. Avocado. [place]. Non-starchy vegetables like broccoli, cauliflower, green beans, mushrooms, peppers, lettuce, and spinach. Unsweetened non-dairy milks like almond milk and coconut milk. Cheese, cottage cheese, and cream cheese. Where can you learn more? Go to https://Thumbtackjameson.[employer]. org and sign in to your [employer] account. Enter C335 in the [facility] box to learn more about \"Learning About Low-Carbohydrate Diets. \"     If you do not have an account, please click on the \"Sign Up Now\" link. Current as of: May 9, 2022               Content Version: 13.4  © 9469-9143 Healthwise, Incorporated. Care instructions adapted under license by [facility] ([facility]). If you have questions about a medical condition or this instruction, always ask your healthcare professional. [address] any warranty or liability for your use of this information.            Learning About Benefits From Quitting Smoking  Why is it important to quit smoking? If you're thinking about quitting smoking, you may have a few reasons to be smoke-free. Your health may be one of them. When you quit smoking, you lower your risk for many serious health problems, such as cancer, lung disease, heart attack, stroke, blood vessel disease, and blindness from macular degeneration. When you're smoke-free, you get sick less often, and you heal faster. You are less likely to get colds, flu, bronchitis, and pneumonia. As a nonsmoker, you may find that your mood is better and you are less stressed. When and how will you feel healthier? Quitting has real health benefits that start from day 1 of being smoke-free. And the longer you stay smoke-free, the healthier you get and the better you feel. The first hours  After just 20 minutes, your blood pressure and heart rate go down. That means there's less stress on your heart and blood vessels. Within 12 hours, the level of carbon monoxide in your blood drops back to normal. That makes room for more oxygen. With more oxygen in your body, you may notice that you have more energy than when you smoked. After 2 weeks  Your lungs start to work better. Your risk of heart attack starts to drop. After 1 month  When your lungs are clear, you cough less and breathe deeper, so it's easier to be active. Your sense of taste and smell return. That means you can enjoy food more than you have since you started smoking. Over the years  Over the years, your risks of heart disease, heart attack, and stroke are lower. After 10 years, your risk of dying from lung cancer is cut by about half. And your risk for many other types of cancer is lower too. How would quitting help others in your life? When you quit smoking, you improve the health of everyone who now breathes in your smoke. Their heart, lung, and cancer risks drop, much like yours. They are sick less.  For babies and small children, living smoke-free means they're less likely to have ear infections, pneumonia, and bronchitis. If you're a woman who is or will be pregnant someday, quitting smoking means a healthier . Children who are close to you are less likely to become adult smokers. Where can you learn more? Go to https://anne.healthSigmaQuest. org and sign in to your [employer] account. Enter [id number] in the [facility] box to learn more about \"Learning About Benefits From Quitting Smoking. \"     If you do not have an account, please click on the \"Sign Up Now\" link. Current as of: 2021               Content Version: 13.4  © 8635-0014 [employer]. Care instructions adapted under license by [facility] ([facility]). If you have questions about a medical condition or this instruction, always ask your healthcare professional. Ric Childers any warranty or liability for your use of this information. Blood pressure quite elevated restart blood pressure medication chlorthalidone amlodipine also pravastatin for hyperlipidemia will markedly reduce cardiovascular risk. Do not smoke cigarette. .Chronic smokers, smokes. .... , conseled risk of smoking, chronic bronchitis, DVT, peripheral vascular diease, cancers, cardio-vascular disease, osteoporosis, COPD/ emphysema, PUD. Advised to quit smoking. ... , discussed nicotine replacement therapy, recommended short term OTC if helps to quit smoking.  3-5 minutes spent on counseling

## 2022-11-29 ENCOUNTER — OFFICE VISIT (OUTPATIENT)
Dept: OBGYN CLINIC | Age: 47
End: 2022-11-29
Payer: MEDICARE

## 2022-11-29 VITALS
DIASTOLIC BLOOD PRESSURE: 100 MMHG | SYSTOLIC BLOOD PRESSURE: 178 MMHG | BODY MASS INDEX: 36.03 KG/M2 | WEIGHT: 266 LBS | HEIGHT: 72 IN

## 2022-11-29 DIAGNOSIS — N92.6 IRREGULAR MENSES: Primary | ICD-10-CM

## 2022-11-29 DIAGNOSIS — I10 ESSENTIAL HYPERTENSION: ICD-10-CM

## 2022-11-29 DIAGNOSIS — F17.210 CIGARETTE SMOKER: ICD-10-CM

## 2022-11-29 DIAGNOSIS — D21.9 FIBROIDS: ICD-10-CM

## 2022-11-29 PROCEDURE — 99213 OFFICE O/P EST LOW 20 MIN: CPT | Performed by: NURSE PRACTITIONER

## 2022-11-29 PROCEDURE — 76830 TRANSVAGINAL US NON-OB: CPT | Performed by: NURSE PRACTITIONER

## 2022-11-29 PROCEDURE — 3080F DIAST BP >= 90 MM HG: CPT | Performed by: NURSE PRACTITIONER

## 2022-11-29 PROCEDURE — 3077F SYST BP >= 140 MM HG: CPT | Performed by: NURSE PRACTITIONER

## 2022-11-29 NOTE — ASSESSMENT & PLAN NOTE
Pt advised to call PCP ASAP. BP today 178/100. Pt recently started norvasc and hygroton last night. States she did not like the side effects, made her feel \"bad\"     Denies urgent cardiac symptoms.    Pt advised if cardiac symptoms develop go to ER or call 911  We discuss BP needs to be better controlled prior to next visit for colposcopy

## 2022-11-29 NOTE — ASSESSMENT & PLAN NOTE
11/29/2022: 3 uterine fibroids noted with largest measuring 5.2 X 4.6 X 5.0 CM  We discuss today fibroid uterus, etiology, pathyophysiology and treatment for fibroids    Multiple medical and surgical treatment options discussed including expectant management, OCP's, provera, Nuvaring, Patch, Nexplanon, Depo, IUD, Lupron, oriahnn, [place], myomectomy, and hysterectomy. Risks, benefits, SE's reviewed including but not limited to H/A, N/V, thromboembolic events, bleeding patterns, weight gain, efficacy, menopausal symptoms and risks of surgery.      Recent cbc nl  Pt declines medication treatment at this time as she wants to work on BP control before starting any new medicines    We will schedule colpo for 2 weeks with Dr Kelsie Lino  If colpo normal, pt will plan f/u with me in 3 months to discuss medication mgmt again

## 2022-11-29 NOTE — PROGRESS NOTES
I have reviewed the patient's visit today including history, exam and assessment by VIRI Sepulveda. I agree with treatment/plan as above.     Tali Michel MD  1:15 PM  11/29/22

## 2022-11-29 NOTE — PROGRESS NOTES
Patient comes in today for gyn follow up with ultrasound. LAST PAP:  11/15/2022, Negative HPV positive    LAST MAMMO:  7+ years ago    LMP:  Patient's last menstrual period was 11/24/2022 (approximate).     BIRTH CONTROL:  Essure    TOBACCO USE:  Yes    FAMILY HISTORY OF:   Breast Cancer:  No   Ovarian Cancer:  No   Uterine Cancer:  No   Colon Cancer:  No    Vitals:    11/29/22 1136   BP: (!) 178/100   Site: Left Upper Arm   Position: Sitting   Weight: 266 lb (120.7 kg)   Height: 6' (1.829 m)        Joceline Ruiz MA  11/29/22  12:00 PM

## 2022-11-29 NOTE — PROGRESS NOTES
Porfirio Jim is a 56 y.o. [id number] who is here today for US and followup. Pt seen 11/15/22 with the following concerns  \"Menses: pt does not keep menstrual calender difficult t obtain hx. Usually at least monthly, although in the last year did have 1 or 2 menses that were a week apart. This is not her normal. Last menses ws light flow, lasted only 2 days\"      Patient's last menstrual period was 2022 (approximate).           Past Medical History:   Diagnosis Date    Abnormal Pap smear of cervix     HPV (human papilloma virus) infection     Hypertension        Past Surgical History:   Procedure Laterality Date    BREAST ENHANCEMENT SURGERY Bilateral     CERCLAGE       SECTION      WRIST SURGERY Bilateral        Family History   Problem Relation Age of Onset    Breast Cancer Neg Hx     Ovarian Cancer Neg Hx     Uterine Cancer Neg Hx     Colon Cancer Neg Hx        Social History     Socioeconomic History    Marital status: Single     Spouse name: Not on file    Number of children: Not on file    Years of education: Not on file    Highest education level: Not on file   Occupational History    Not on file   Tobacco Use    Smoking status: Every Day     Types: Cigarettes    Smokeless tobacco: Never   Substance and Sexual Activity    Alcohol use: Never    Drug use: Never    Sexual activity: Not Currently     Birth control/protection: Surgical     Comment: essure   Other Topics Concern    Not on file   Social History Narrative    Not on file     Social Determinants of Health     Financial Resource Strain: Not on file   Food Insecurity: Not on file   Transportation Needs: Not on file   Physical Activity: Not on file   Stress: Not on file   Social Connections: Not on file   Intimate Partner Violence: Not on file   Housing Stability: Not on file           Objective:    Vitals:    22 1136   BP: (!) 178/100   Site: Left Upper Arm   Position: Sitting   Weight: 266 lb (120.7 kg)   Height: 6' (1.829 m) Constitutional:  well-developed, well-nourished, and in no distress. Mental: Alert and awake. Oriented to person/place/time. Able to follow commands    Eyes: EOM nl, Sclera nl, Ocular Discharge not visualized    HENT: Normocephalic and atraumatic. Mouth/Throat: Mucous membranes are moist    External Ears: nl    Neck: Normal range of motion. No masses visualized       Pulmonary: Effort normal. No visualized signs of difficulty breathing or respiratory distress    Musculoskeletal: Normal range of motion. Normal gait with no signs of ataxia     Neurological: No Facial Asymmetry (Cranial nerve 7 motor function) No gaze palsy    Skin: No significant exanthematous lesions or discoloration noted on facial skin      Psych: Normal affect. No hallucinations              Assessment/Plan            Patient Active Problem List    Diagnosis Date Noted    Fibroids 11/29/2022     Priority: Medium     Assessment & Plan Note:     11/29/2022: 3 uterine fibroids noted with largest measuring 5.2 X 4.6 X 5.0 CM  We discuss today fibroid uterus, etiology, pathyophysiology and treatment for fibroids    Multiple medical and surgical treatment options discussed including expectant management, OCP's, provera, Nuvaring, Patch, Nexplanon, Depo, IUD, Lupron, oriahnn, Saint Samuel, myomectomy, and hysterectomy. Risks, benefits, SE's reviewed including but not limited to H/A, N/V, thromboembolic events, bleeding patterns, weight gain, efficacy, menopausal symptoms and risks of surgery.      Recent cbc nl  Pt declines medication treatment at this time as she wants to work on BP control before starting any new medicines    We will schedule colpo for 2 weeks with Dr Vic Lockhart  If colpo normal, pt will plan f/u with me in 3 months to discuss medication mgmt again              Arthritis 11/28/2022     Priority: Medium    Multiple risk factors for coronary artery disease 11/28/2022     Priority: Medium    Cervical high risk HPV (human papillomavirus) test positive 11/28/2022     Priority: Medium    Pre-diabetes 11/28/2022     Priority: Medium    Medication management 11/15/2022     Priority: Medium    Irregular menses 11/15/2022     Priority: Medium    Severe obesity (BMI 35.0-39. 9) with comorbidity ([address] 10/26/2022     Priority: Medium    Essential hypertension 10/26/2022     Priority: Medium     Assessment & Plan Note:     Pt advised to call PCP ASAP. BP today 178/100. Pt recently started norvasc and hygroton last night. States she did not like the side effects, made her feel \"bad\"     Denies urgent cardiac symptoms. Pt advised if cardiac symptoms develop go to ER or call 911  We discuss BP needs to be better controlled prior to next visit for colposcopy      Cigarette smoker 07/21/2022     Priority: Medium     Overview Note:     Last Assessment & Plan:   Formatting of this note is different from the original.  Condition: Everyday Smoker  Member encouraged to continue working on smoking cessation. Encouraged to discuss options with PCP. Provided number 6-713-QUIT-NOW ([phone]) for free confidential help to assist her with quitting smoking. [employer]uit[employer] provide free coaching--over the phone--to help you quit smoking. When you call 2-997-Zeer-NOW , you can speak confidentially with a highly trained quit .     Follow up in: three months       Assessment & Plan Note:     Continued efforts as cessation encouraged, pt has been decreasing amt of cigarettes daily      Primary osteoarthritis of right ankle 07/21/2022     Priority: Medium     Overview Note:     Last Assessment & Plan:   Formatting of this note might be different from the original.  Condition: stable  Cont therapy 2x a week   Take medications as Rx  Follow up in: three months      Psoriasis 07/21/2022     Priority: Medium     Overview Note:     Last Assessment & Plan:   Formatting of this note might be different from the original.  Condition: stable  Ensure medication compliance as prescribed by your PCP/Specialist.  Keep all schedule appointments. Follow up in: three months         Problem List Items Addressed This Visit          Circulatory    Essential hypertension     Pt advised to call PCP ASAP. BP today 178/100. Pt recently started norvasc and hygroton last night. States she did not like the side effects, made her feel \"bad\"     Denies urgent cardiac symptoms. Pt advised if cardiac symptoms develop go to ER or call 911  We discuss BP needs to be better controlled prior to next visit for colposcopy            Other    Cigarette smoker     Continued efforts as cessation encouraged, pt has been decreasing amt of cigarettes daily         Fibroids     11/29/2022: 3 uterine fibroids noted with largest measuring 5.2 X 4.6 X 5.0 CM  We discuss today fibroid uterus, etiology, pathyophysiology and treatment for fibroids    Multiple medical and surgical treatment options discussed including expectant management, OCP's, provera, Nuvaring, Patch, Nexplanon, Depo, IUD, Lupron, oriahnn, Saint Samuel, myomectomy, and hysterectomy. Risks, benefits, SE's reviewed including but not limited to H/A, N/V, thromboembolic events, bleeding patterns, weight gain, efficacy, menopausal symptoms and risks of surgery.      Recent cbc nl  Pt declines medication treatment at this time as she wants to work on BP control before starting any new medicines    We will schedule colpo for 2 weeks with Dr Naima Mcneil  If colpo normal, pt will plan f/u with me in 3 months to discuss medication mgmt again                 Relevant Orders    AMB POC US, TRANSVAGINAL (Completed)    Irregular menses - Primary    Relevant Orders    AMB POC US, TRANSVAGINAL (Completed)       Orders Placed This Encounter   Procedures    AMB POC US, TRANSVAGINAL       Outpatient Encounter Medications as of 11/29/2022   Medication Sig Dispense Refill    chlorthalidone (HYGROTON) 25 MG tablet Take 1 tablet by mouth daily 90 tablet 3    amLODIPine (NORVASC) 5 MG tablet Take 1 tablet by mouth daily 90 tablet 1    pravastatin (PRAVACHOL) 40 MG tablet Take 1 tablet by mouth daily 90 tablet 5    Multiple Vitamins-Minerals (MULTIVITAMIN WITH MINERALS) tablet Once daily  tablet 3    ketoconazole (NIZORAL) 2 % shampoo WASH SCALP 2-3 TIMES A WEEK, LET IT SIT FOR 10-15 MINUTES THEN RINSE      fluocinolone (DERMA-SMOOTHE) 0.01 % external oil Apply topically 3 times daily Apply topically. diclofenac (VOLTAREN) 75 MG EC tablet       diclofenac sodium (VOLTAREN) 1 % GEL Apply 4 g topically 4 times daily (Patient not taking: Reported on 11/29/2022) 100 g 3    meloxicam (MOBIC) 7.5 MG tablet TAKE 1 TABLET BY MOUTH TWICE DAILY AS NEEDED FOR MODERATE PAIN (Patient not taking: Reported on 11/29/2022)       No facility-administered encounter medications on file as of 11/29/2022.                Rosina Baires NP, APRN - CNP 11/29/22 12:58 PM

## 2022-12-15 ENCOUNTER — OFFICE VISIT (OUTPATIENT)
Dept: OBGYN CLINIC | Age: 47
End: 2022-12-15

## 2022-12-15 VITALS
DIASTOLIC BLOOD PRESSURE: 86 MMHG | WEIGHT: 266 LBS | BODY MASS INDEX: 36.03 KG/M2 | HEIGHT: 72 IN | SYSTOLIC BLOOD PRESSURE: 132 MMHG

## 2022-12-15 DIAGNOSIS — R87.810 CERVICAL HIGH RISK HPV (HUMAN PAPILLOMAVIRUS) TEST POSITIVE: Primary | ICD-10-CM

## 2022-12-15 DIAGNOSIS — F17.210 CIGARETTE SMOKER: ICD-10-CM

## 2022-12-15 LAB
HCG, PREGNANCY, URINE, POC: NEGATIVE
VALID INTERNAL CONTROL, POC: YES

## 2022-12-15 ASSESSMENT — PATIENT HEALTH QUESTIONNAIRE - PHQ9
SUM OF ALL RESPONSES TO PHQ QUESTIONS 1-9: 0
SUM OF ALL RESPONSES TO PHQ9 QUESTIONS 1 & 2: 0
SUM OF ALL RESPONSES TO PHQ QUESTIONS 1-9: 0
2. FEELING DOWN, DEPRESSED OR HOPELESS: 0
1. LITTLE INTEREST OR PLEASURE IN DOING THINGS: 0

## 2022-12-15 NOTE — PROGRESS NOTES
[address], [address], [address]  Angel Rosas MD, [facility]  Joselito Izquierdo MD, FACOG    Colposcopy Procedure Note      Indications: This is a 56 y.o. Black / [place] American female, [id number] who presents for a colposcopy. Pap smear showed:     Problem List Items Addressed This Visit          Other    Cigarette smoker     D/W pt at length neg effects of tobacco abuse including but not limited to: death, multiple forms of CA, COPD, CAD, vascular damage, etc.  Encouraged cessation and D/W her methods (tapering, counseling, nicotine patches/gums, etc.)  Approximately 4 minutes spent in face to face counseling          Cervical high risk HPV (human papillomavirus) test positive - Primary     As per colpo form today         Relevant Orders    AMB POC URINE PREGNANCY TEST, VISUAL COLOR COMPARISON        Procedure:  After informed consent obtained, pt placed in dorsal lithotomy position. Bivalved speculum placed in vagina without difficulty. Acetic acid placed over entire face of cervix. Colposcopy performed in usually fashion. Entire SCJ seen - adequate colpo. No acetowhite changes noted  ECC also perfomed  Pt tolerated procedure well. Specimens: none    Complications: none    Colposcopic Dx:  Normal    Plan:    D/W patient folllow up and surveillance, plan to repeat pap smear in 12 months with co-testing in accordance with ASCCP guidelines. Also encouraged D/C tob, take MVIs and increase consumption of cruciferous vegetables and other antioxidants.          Rony Mclaughlin MD   2:24 PM  12/15/22

## 2022-12-15 NOTE — PROGRESS NOTES
Patient here for pap smear. LAST PAP:  11/15/2022, neg., HPV pos. LAST MAMMO: Patient can not remember last mammo, has been +7 yrs as stated by patient. LMP:  Patient's last menstrual period was 11/24/2022 (approximate).     BIRTH CONTROL:    Essure     TOBACCO USE:  Yes    FAMILY HISTORY OF:   Breast Cancer:  No   Ovarian Cancer:  No   Uterine Cancer:  No   Colon Cancer:  No    Vitals:    12/15/22 1358   BP: 132/86   Site: Left Upper Arm   Position: Sitting   Weight: 266 lb (120.7 kg)   Height: 6' (1.829 m)        Deandre Zamudio RN  12/15/22  2:18 PM

## 2022-12-15 NOTE — ASSESSMENT & PLAN NOTE
D/W pt at length neg effects of tobacco abuse including but not limited to: death, multiple forms of CA, COPD, CAD, vascular damage, etc.  Encouraged cessation and D/W her methods (tapering, counseling, nicotine patches/gums, etc.)  Approximately 4 minutes spent in face to face counseling

## 2022-12-15 NOTE — PATIENT INSTRUCTIONS
We encourage you to stop smoking, take a multivitamin each day and eat more cruciferous vegetables (grown in the ground: cauliflower, broccoli, carrots, etc) and other things high in antioxidants like green tea, pomegranate, dark chocolate, etc.  This will boost your immune system and help with your abnormal pap smear. Please schedule a follow-up appointment in 12 months for another pap smear. Thanks for coming to see us today and letting us take care of you!

## 2023-07-17 ENCOUNTER — COMMUNITY OUTREACH (OUTPATIENT)
Dept: PRIMARY CARE CLINIC | Facility: CLINIC | Age: 48
End: 2023-07-17

## 2023-10-11 PROBLEM — I10 HYPERTENSION: Status: ACTIVE | Noted: 2023-04-13

## 2024-03-11 ENCOUNTER — OFFICE VISIT (OUTPATIENT)
Dept: OBGYN CLINIC | Age: 49
End: 2024-03-11
Payer: MEDICAID

## 2024-03-11 VITALS
SYSTOLIC BLOOD PRESSURE: 142 MMHG | BODY MASS INDEX: 34.13 KG/M2 | DIASTOLIC BLOOD PRESSURE: 96 MMHG | WEIGHT: 252 LBS | HEIGHT: 72 IN

## 2024-03-11 DIAGNOSIS — R87.810 CERVICAL HIGH RISK HPV (HUMAN PAPILLOMAVIRUS) TEST POSITIVE: ICD-10-CM

## 2024-03-11 DIAGNOSIS — Z12.31 SCREENING MAMMOGRAM, ENCOUNTER FOR: ICD-10-CM

## 2024-03-11 DIAGNOSIS — N92.6 IRREGULAR MENSES: ICD-10-CM

## 2024-03-11 DIAGNOSIS — F17.210 CIGARETTE SMOKER: ICD-10-CM

## 2024-03-11 DIAGNOSIS — Z76.89 ENCOUNTER TO ESTABLISH CARE WITH NEW DOCTOR: ICD-10-CM

## 2024-03-11 DIAGNOSIS — I10 ESSENTIAL HYPERTENSION: ICD-10-CM

## 2024-03-11 DIAGNOSIS — D25.2 INTRAMURAL AND SUBSEROUS LEIOMYOMA OF UTERUS: ICD-10-CM

## 2024-03-11 DIAGNOSIS — D25.1 INTRAMURAL AND SUBSEROUS LEIOMYOMA OF UTERUS: ICD-10-CM

## 2024-03-11 DIAGNOSIS — Z01.419 WOMEN'S ANNUAL ROUTINE GYNECOLOGICAL EXAMINATION: Primary | ICD-10-CM

## 2024-03-11 PROBLEM — Z79.899 MEDICATION MANAGEMENT: Status: RESOLVED | Noted: 2022-11-15 | Resolved: 2024-03-11

## 2024-03-11 PROCEDURE — 99406 BEHAV CHNG SMOKING 3-10 MIN: CPT | Performed by: OBSTETRICS & GYNECOLOGY

## 2024-03-11 PROCEDURE — 3080F DIAST BP >= 90 MM HG: CPT | Performed by: OBSTETRICS & GYNECOLOGY

## 2024-03-11 PROCEDURE — 3077F SYST BP >= 140 MM HG: CPT | Performed by: OBSTETRICS & GYNECOLOGY

## 2024-03-11 PROCEDURE — 99396 PREV VISIT EST AGE 40-64: CPT | Performed by: OBSTETRICS & GYNECOLOGY

## 2024-03-11 NOTE — ASSESSMENT & PLAN NOTE
D/W pt that her blood pressure is elevated today (pt non-compliant with meds). Encouraged patient to follow up with PCP soon and to seek immediate care if she develops any symptoms including but not limited to: severe headache, chest pain, shortness of breath, vision changes or any numbness or tingling in her face or extremities.

## 2024-03-11 NOTE — PROGRESS NOTES
Chaperone for Intimate Exam     Chaperone was offer accepted as part of the rooming process    Chaperone: Joceline Ruiz

## 2024-03-11 NOTE — ASSESSMENT & PLAN NOTE
D/W pt that may be combo of perimenopause and/or known fibroids, post-BTL syndrome  Offered pt cyclic P4, POP, IUD - she declines any intervention currently

## 2024-03-11 NOTE — PROGRESS NOTES
[facility]  [address], [place] [address]  [phone]    Fausto Oneill MD, FACOG  [facility]  Shalonda Menjivar MD, FACOG      Assessment/Plan     Patient Active Problem List    Diagnosis Date Noted    Intramural and subserous leiomyoma of uterus 11/29/2022     Priority: Medium     Overview Note:     11/29/2022: 3 uterine fibroids noted with largest measuring 5.2 x 4.6 x 5.0 cm       Arthritis 11/28/2022     Priority: Medium    Multiple risk factors for coronary artery disease 11/28/2022     Priority: Medium    Cervical high risk HPV (human papillomavirus) test positive 11/28/2022     Priority: Medium     Overview Note:     11/15/22:  Neg pap, (+) HPV, (+) 16  12/15/22:  neg colpo (no Bx)       Assessment & Plan Note:     Repeat pap today       Pre-diabetes 11/28/2022     Priority: Medium    Irregular menses 11/15/2022     Priority: Medium     Overview Note:     noted       Assessment & Plan Note:     D/W pt that may be combo of perimenopause and/or known fibroids, post-BTL syndrome  Offered pt cyclic P4, POP, IUD - she declines any intervention currently      Severe obesity (BMI 35.0-39.9) with comorbidity (HCC) 10/26/2022     Priority: Medium    Essential hypertension 10/26/2022     Priority: Medium     Overview Note:     Mgmt per PCP       Assessment & Plan Note:     D/W pt that her blood pressure is elevated today (pt non-compliant with meds). Encouraged patient to follow up with PCP soon and to seek immediate care if she develops any symptoms including but not limited to: severe headache, chest pain, shortness of breath, vision changes or any numbness or tingling in her face or extremities.       Cigarette smoker 07/21/2022     Priority: Medium     Overview Note:     noted       Assessment & Plan Note:     D/W pt at length neg effects of tobacco abuse including but not limited to: death, multiple forms of CA, COPD, CAD, vascular damage, etc.  Encouraged cessation and D/W

## 2024-03-11 NOTE — PATIENT INSTRUCTIONS
STOP SMOKING!  Your blood pressure is elevated today. Please follow up with your regular doctor soon and seek immediate care if you develop any symptoms including but not limited to: severe headache, chest pain, shortness of breath, vision changes or any numbness or tingling in your face or extremities.  We will call you if anything is abnormal from your testing today.   They will call you to schedule your mammogram  Follow up as needed or next year for your yearly female exam.  Thanks for coming to see us today and letting us take care of you!

## 2024-03-11 NOTE — ASSESSMENT & PLAN NOTE
D/W pt that in low risk patients that recommendation is for mammograms every other year in her 40's then annually at age 51

## 2024-03-11 NOTE — PROGRESS NOTES
Pt comes in today for AE. Pt states periods have been irregular. Did not have one in December, had one in January and February. Last period was 02/27-03/05     LAST PAP:  11/15/2022 negative, HPV 16 positive     LAST MAMMO:  unknown     LMP:  Patient's last menstrual period was 02/27/2024 (approximate).    BIRTH CONTROL:  Essure     TOBACCO USE:  Yes    FAMILY HISTORY OF:   Breast Cancer:  No   Ovarian Cancer:  No   Uterine Cancer:  No   Colon Cancer:  No    Vitals:    03/11/24 1123   BP: (!) 142/96   Site: Left Upper Arm   Position: Sitting   Weight: 114.3 kg (252 lb)   Height: 1.829 m (6')        Fani Rosenthal MA  03/11/24  11:32 AM

## 2024-04-01 LAB
C TRACH RRNA CVX QL NAA+PROBE: NEGATIVE
COLLECTION METHOD: ABNORMAL
CYTOLOGIST CVX/VAG CYTO: ABNORMAL
CYTOLOGY CVX/VAG DOC THIN PREP: ABNORMAL
DATE OF LMP: ABNORMAL
HPV APTIMA: POSITIVE
HPV GENOTYPE REFLEX: ABNORMAL
Lab: ABNORMAL
N GONORRHOEA RRNA CVX QL NAA+PROBE: NEGATIVE
PAP SOURCE: ABNORMAL
PATH REPORT.FINAL DX SPEC: ABNORMAL
PATHOLOGIST CVX/VAG CYTO: ABNORMAL
PATHOLOGIST PROVIDED ICD: ABNORMAL
STAT OF ADQ CVX/VAG CYTO-IMP: ABNORMAL
T VAGINALIS RRNA SPEC QL NAA+PROBE: NEGATIVE

## 2024-04-02 ENCOUNTER — TELEPHONE (OUTPATIENT)
Dept: OBGYN CLINIC | Age: 49
End: 2024-04-02

## 2024-04-02 PROBLEM — R87.612 LGSIL ON PAP SMEAR OF CERVIX: Status: ACTIVE | Noted: 2024-04-02

## 2024-04-02 NOTE — TELEPHONE ENCOUNTER
ANNALISAVM for patient letting her know that I wanted to discuss something's that is concerning. Asked that she call me back. mc

## 2024-04-02 NOTE — TELEPHONE ENCOUNTER
----- Message from Fausto Oneill MD sent at 4/2/2024  8:04 AM EDT -----  Please notify patient of abnormal pap smear and need for further evaluation with colposcopy.  Please schedule.

## 2024-04-02 NOTE — TELEPHONE ENCOUNTER
Attempted to contact patient to discuss results. Call rang several times then started beeping. Will try again later. ivanna

## 2024-04-02 NOTE — TELEPHONE ENCOUNTER
Patient called concerning nurses note letting her know that she needs to schedule an appointment for a colposcopy due to abnormal pap.  She stated that she has had abnormal paps before and declined scheduling the appointment.  She stated that medicaid would not pay for it and that she has a history of them being abnormal.  She does not want to schedule an appointment.

## 2024-04-03 ENCOUNTER — TELEPHONE (OUTPATIENT)
Dept: OBGYN CLINIC | Age: 49
End: 2024-04-03

## 2024-04-03 NOTE — TELEPHONE ENCOUNTER
Patient had LM returning my call. She stated she goes on break again at 11 am.     I called her back to discuss her results. Patient states she only had family planning for medicaid. She stated she started a new job that does not offer insurance. Patient was educated on her pap smear from 2018 up until recently and the significance of getting the colposcopy. Patient stated the only reason she does not want the colpo is due to her insurance. I advised that patient that I will give her information to Rosalia to have her call to do a prior auth so that we can do the procedure. Patient voiced understanding. Patient advised that either I or Rosalia will give her a call back.       Rosalia,    This patient needs a colposcopy due to her pap smear being abnormal. She does work 1st shift right now so we may need to LM for her or try her around 11 am when she has break. Thanks, Joceline

## 2024-04-03 NOTE — TELEPHONE ENCOUNTER
Called patient to schedule colpo. No answer LM - advised she call back to schedule. No futher details left. DI

## 2024-04-10 PROBLEM — Z12.31 SCREENING MAMMOGRAM, ENCOUNTER FOR: Status: RESOLVED | Noted: 2024-03-11 | Resolved: 2024-04-10

## 2024-04-10 PROBLEM — Z01.419 WOMEN'S ANNUAL ROUTINE GYNECOLOGICAL EXAMINATION: Status: RESOLVED | Noted: 2024-03-11 | Resolved: 2024-04-10
